# Patient Record
Sex: FEMALE | Race: WHITE
[De-identification: names, ages, dates, MRNs, and addresses within clinical notes are randomized per-mention and may not be internally consistent; named-entity substitution may affect disease eponyms.]

---

## 2017-05-07 ENCOUNTER — HOSPITAL ENCOUNTER (EMERGENCY)
Dept: HOSPITAL 62 - ER | Age: 62
Discharge: HOME | End: 2017-05-07
Payer: COMMERCIAL

## 2017-05-07 VITALS — SYSTOLIC BLOOD PRESSURE: 144 MMHG | DIASTOLIC BLOOD PRESSURE: 86 MMHG

## 2017-05-07 DIAGNOSIS — N39.0: ICD-10-CM

## 2017-05-07 DIAGNOSIS — R25.2: ICD-10-CM

## 2017-05-07 DIAGNOSIS — L29.8: ICD-10-CM

## 2017-05-07 DIAGNOSIS — Z88.2: ICD-10-CM

## 2017-05-07 DIAGNOSIS — Z85.41: ICD-10-CM

## 2017-05-07 DIAGNOSIS — G43.001: Primary | ICD-10-CM

## 2017-05-07 DIAGNOSIS — R42: ICD-10-CM

## 2017-05-07 DIAGNOSIS — I10: ICD-10-CM

## 2017-05-07 DIAGNOSIS — T37.0X5A: ICD-10-CM

## 2017-05-07 LAB
ALBUMIN SERPL-MCNC: 4.5 G/DL (ref 3.5–5)
ALP SERPL-CCNC: 92 U/L (ref 38–126)
ALT SERPL-CCNC: 55 U/L (ref 9–52)
ANION GAP SERPL CALC-SCNC: 10 MMOL/L (ref 5–19)
APPEARANCE UR: CLEAR
AST SERPL-CCNC: 38 U/L (ref 14–36)
BASOPHILS # BLD AUTO: 0 10^3/UL (ref 0–0.2)
BASOPHILS NFR BLD AUTO: 0.2 % (ref 0–2)
BILIRUB DIRECT SERPL-MCNC: 0.6 MG/DL (ref 0–0.4)
BILIRUB SERPL-MCNC: 1.1 MG/DL (ref 0.2–1.3)
BILIRUB UR QL STRIP: NEGATIVE
BUN SERPL-MCNC: 8 MG/DL (ref 7–20)
CALCIUM: 10.1 MG/DL (ref 8.4–10.2)
CHLORIDE SERPL-SCNC: 102 MMOL/L (ref 98–107)
CO2 SERPL-SCNC: 26 MMOL/L (ref 22–30)
CREAT SERPL-MCNC: 0.86 MG/DL (ref 0.52–1.25)
EOSINOPHIL # BLD AUTO: 0.1 10^3/UL (ref 0–0.6)
EOSINOPHIL NFR BLD AUTO: 1.5 % (ref 0–6)
ERYTHROCYTE [DISTWIDTH] IN BLOOD BY AUTOMATED COUNT: 14.6 % (ref 11.5–14)
GLUCOSE SERPL-MCNC: 110 MG/DL (ref 75–110)
GLUCOSE UR STRIP-MCNC: NEGATIVE MG/DL
HCT VFR BLD CALC: 43.9 % (ref 36–47)
HGB BLD-MCNC: 14.8 G/DL (ref 12–15.5)
HGB HCT DIFFERENCE: 0.5
KETONES UR STRIP-MCNC: NEGATIVE MG/DL
LYMPHOCYTES # BLD AUTO: 0.9 10^3/UL (ref 0.5–4.7)
LYMPHOCYTES NFR BLD AUTO: 15 % (ref 13–45)
MCH RBC QN AUTO: 27.8 PG (ref 27–33.4)
MCHC RBC AUTO-ENTMCNC: 33.6 G/DL (ref 32–36)
MCV RBC AUTO: 83 FL (ref 80–97)
MONOCYTES # BLD AUTO: 0.3 10^3/UL (ref 0.1–1.4)
MONOCYTES NFR BLD AUTO: 5.9 % (ref 3–13)
NEUTROPHILS # BLD AUTO: 4.6 10^3/UL (ref 1.7–8.2)
NEUTS SEG NFR BLD AUTO: 77.4 % (ref 42–78)
NITRITE UR QL STRIP: NEGATIVE
PH UR STRIP: 7 [PH] (ref 5–9)
POTASSIUM SERPL-SCNC: 3.7 MMOL/L (ref 3.6–5)
PROT SERPL-MCNC: 8 G/DL (ref 6.3–8.2)
PROT UR STRIP-MCNC: NEGATIVE MG/DL
RBC # BLD AUTO: 5.32 10^6/UL (ref 3.72–5.28)
SODIUM SERPL-SCNC: 138.1 MMOL/L (ref 137–145)
SP GR UR STRIP: 1.01
UROBILINOGEN UR-MCNC: NEGATIVE MG/DL (ref ?–2)
WBC # BLD AUTO: 5.9 10^3/UL (ref 4–10.5)

## 2017-05-07 PROCEDURE — 85025 COMPLETE CBC W/AUTO DIFF WBC: CPT

## 2017-05-07 PROCEDURE — 36415 COLL VENOUS BLD VENIPUNCTURE: CPT

## 2017-05-07 PROCEDURE — 70450 CT HEAD/BRAIN W/O DYE: CPT

## 2017-05-07 PROCEDURE — 96361 HYDRATE IV INFUSION ADD-ON: CPT

## 2017-05-07 PROCEDURE — 96376 TX/PRO/DX INJ SAME DRUG ADON: CPT

## 2017-05-07 PROCEDURE — 99284 EMERGENCY DEPT VISIT MOD MDM: CPT

## 2017-05-07 PROCEDURE — 96374 THER/PROPH/DIAG INJ IV PUSH: CPT

## 2017-05-07 PROCEDURE — 80053 COMPREHEN METABOLIC PANEL: CPT

## 2017-05-07 PROCEDURE — 81001 URINALYSIS AUTO W/SCOPE: CPT

## 2017-05-07 PROCEDURE — 87086 URINE CULTURE/COLONY COUNT: CPT

## 2017-05-07 PROCEDURE — 96375 TX/PRO/DX INJ NEW DRUG ADDON: CPT

## 2017-05-07 RX ADMIN — SODIUM CHLORIDE PRN ML: 9 INJECTION, SOLUTION INTRAVENOUS at 16:40

## 2017-05-07 RX ADMIN — SODIUM CHLORIDE PRN ML: 9 INJECTION, SOLUTION INTRAVENOUS at 14:53

## 2017-05-07 NOTE — ER DOCUMENT REPORT
ED Medical Screen (RME)





- General


Chief Complaint: Headache


Stated Complaint: HEADACHE,BACK PAIN,NAUSEA


Time seen by provider: 13:53


Mode of Arrival: Ambulatory


Information source: Patient


Notes: 


This is a 62-year-old female with a history of chronic neuropathic pain to the 

right upper extremity who presents with itching, cramping after taking one 

Bactrim.  Patient was recently diagnosed with a possible UTI and started on 

Bactrim last night.  She states within 10 minutes, she started having diffuse 

itching followed by cramping and nausea.


TRAVEL OUTSIDE OF THE [place] IN LAST 30 DAYS: No





- Related Data


Allergies/Adverse Reactions: 


 





aspirin [Aspirin] Adverse Reaction (Mild, Verified 17 13:17)


 rash


Sulfa (Sulfonamide Antibiotics) Adverse Reaction (Verified 17 13:51)


 











Past Medical History





- Social History


Frequency of alcohol use: None


Drug Abuse: None


Renal/ Medical History: Denies: Hx Peritoneal Dialysis


Malignancy Medical History: Reports: Hx Cervical Cancer


GI Medical History: Reports: Hx Gastroesophageal Reflux Disease


Musculoskeltal Medical History: Reports Hx Arthritis


Psychiatric Medical History: Reports: Hx Depression - anxiety


Past Surgical History: Reports: Hx Appendectomy, Hx  Section, Hx 

Hysterectomy, Hx Tonsillectomy





- Immunizations


Hx Diphtheria, Pertussis, Tetanus Vaccination: Yes





Physical Exam





- Vital signs


Vitals: 





 











Temp Pulse Resp BP Pulse Ox


 


 98.9 F   86   20   108/81   99 


 


 17 13:17  17 13:17  17 13:17  17 13:17  17 13:17














Course





- Vital Signs


Vital signs: 





 











Temp Pulse Resp BP Pulse Ox


 


 98.9 F   86   20   108/81   99 


 


 17 13:17  17 13:17  17 13:17  17 13:17  17 13:17

## 2017-05-07 NOTE — ER DOCUMENT REPORT
ED General





- General


Mode of Arrival: Ambulatory


Information source: Patient, Relative - Spouse


TRAVEL OUTSIDE OF THE [place] IN LAST 30 DAYS: No





- HPI


Onset: Yesterday - Refer to HPI notes


Quality of pain: Cramping


Similar symptoms previously: No


Recently seen / treated by doctor: Yes





<CHIP HAMILTON - Last Filed: 17 19:21>





<CASJUANITAPAXTON - Last Filed: 17 21:37>





- General


Chief Complaint: Headache


Stated Complaint: HEADACHE,BACK PAIN,NAUSEA


Notes: 


Patient is a 62-year-old female presented to the emergency department for 

itching, cramping, and headache after taking Bactrim. Patient states that she 

took her first dose of Bactrim last night around 18:00 and her symptoms were 

onset 10 minutes after. Patient states she was diagnosed with a UTI by 

[facility] and prescribed Bactrim. Patient only took the initial 

dose yesterday and has not taken any further doses. Patient states that she had 

some itching on her hands and forearms and the right side of her body had 

muscle cramps. Patient also complains to a headache which she describes as 

pressure and relates her head pain to a previous migraine. Patient states that 

she does not get migraines or headaches like this when she has her chronic 

nerve pain. Patient does complain of some dizziness which is chronic. Patient 

states that she has chronic neuropathic pain to the right side of her body from 

a previous fall. Patient denies taking any muscle examiners. (CHIP HAMILTON)





- Related Data


Allergies/Adverse Reactions: 


 





aspirin [Aspirin] Adverse Reaction (Mild, Verified 17 13:17)


 rash


Sulfa (Sulfonamide Antibiotics) Adverse Reaction (Verified 17 13:51)


 











Past Medical History





- General


Information source: Patient





- Social History


Smoking Status: Never Smoker


Frequency of alcohol use: None


Drug Abuse: None


Family History: Reviewed & Not Pertinent


Patient has suicidal ideation: No


Patient has homicidal ideation: No


Renal/ Medical History: Denies: Hx Peritoneal Dialysis


Malignancy Medical History: Reports: Hx Cervical Cancer


GI Medical History: Reports: Hx Gastroesophageal Reflux Disease


Musculoskeltal Medical History: Reports Hx Arthritis


Psychiatric Medical History: Reports: Hx Depression - anxiety


Past Surgical History: Reports: Hx Appendectomy, Hx  Section, Hx 

Hysterectomy, Hx Tonsillectomy





- Immunizations


Hx Diphtheria, Pertussis, Tetanus Vaccination: Yes





<CHIP HAMILTON - Last Filed: 17 19:21>





Review of Systems





- Review of Systems


Constitutional: No symptoms reported


EENT: No symptoms reported


Cardiovascular: No symptoms reported


Respiratory: No symptoms reported


Gastrointestinal: No symptoms reported


Genitourinary: No symptoms reported


Female Genitourinary: No symptoms reported


Musculoskeletal: See HPI


Skin: See HPI, Change in color


Hematologic/Lymphatic: No symptoms reported


Neurological/Psychological: See HPI, Headaches


-: Yes All other systems reviewed and negative





<HARRISONCHIP DOWNEY - Last Filed: 17 19:21>





Physical Exam





<HARRISONSHAYANTYLERCHIP - Last Filed: 17 19:21>





<PAXTON DONATO - Last Filed: 17 21:37>





- Vital signs


Vitals: 


 











Temp Pulse Resp BP Pulse Ox


 


 98.9 F   86   20   108/81   99 


 


 17 13:17  17 13:17  17 13:17  17 13:17  17 13:17














- Notes


Notes: 


GENERAL: Alert, interacts well. No acute distress.


HEAD: Normocephalic, atraumatic.


EYES: Pupils equal, round, and reactive to light. Extraocular movements intact.


ENT: Oral mucosa moist, tongue midline. No drooling


NECK: Right-sided paracervical tenderness to palpation. Full range of motion. 

Supple. Trachea midline.


LUNGS: Clear to auscultation bilaterally, no wheezes, rales, or rhonchi. No 

respiratory distress.


HEART: Regular rate and rhythm. No murmurs, gallops, or rubs.


ABDOMEN: Soft, non-tender. Non-distended. Bowel sounds present in all 4 

quadrants.


EXTREMITIES: Moves all 4 extremities spontaneously. No edema, radial and 

dorsalis pedis pulses 2/4 bilaterally. No cyanosis.


NEUROLOGICAL: Alert and oriented x3. Photophobia. Normal speech. Cranial nerves 

II through XII grossly intact. Biceps and patellar DTRs 2+ bilaterally.


PSYCH: Normal affect, normal mood.


SKIN: Warm, dry, normal turgor. No hives, rashes or lesions noted. (CHIP HAMILTON)





Course





- Laboratory


Result Diagrams: 


 17 14:30





 17 14:30





<CHIP HAMILTON - Last Filed: 17 19:21>





- Laboratory


Result Diagrams: 


 17 14:30





 17 14:30





<PAXTON DONATO - Last Filed: 17 21:37>





- Vital Signs


Vital signs: 


 











Temp Pulse Resp BP Pulse Ox


 


 98.9 F   67   18   144/86 H  99 


 


 17 13:17  17 21:20  17 21:20  17 21:20  17 21:20














- Laboratory


Laboratory results interpreted by me: 


 











  17





  14:30 14:30 14:30


 


RBC  5.32 H  


 


RDW  14.6 H  


 


Direct Bilirubin   0.6 H 


 


AST   38 H 


 


ALT   55 H 


 


Urine Blood    SMALL H














Discharge





<CHIP HAMILTON - Last Filed: 17 19:21>





<PAXTON DONATO - Last Filed: 17 21:37>





- Discharge


Clinical Impression: 


 Muscle cramping





Migraine


Qualifiers:


 Migraine type: without aura Status migrainosus presence: with status 

migrainosus Intractability: not intractable Qualified Code(s): G43.001 - 

Migraine without aura, not intractable, with status migrainosus





Adverse reaction to sulfa antibiotic


Qualifiers:


 Encounter type: initial encounter Qualified Code(s): T37.0X5A - Adverse effect 

of sulfonamides, initial encounter





Hypertension


Qualifiers:


 Hypertension type: essential hypertension Qualified Code(s): I10 - Essential (

primary) hypertension





Condition: Stable


Disposition: HOME, SELF-CARE


Additional Instructions: 


Today we did not see any sign of infection or bleeding in her brain.  We 

treated her headache initially with Toradol, Compazine and Benadryl.  We later 

treated with Haldol and Benadryl.  The Haldol and Benadryl seemed to work 

better for you.  Please drink plenty of fluids and do not skip any of her home 

medications.  Please follow-up with your primary care physician within the next 

several days.


Forms:  Elevated Blood Pressure


Scribe Attestation: 





17 21:37


I personally performed the services described in the documentation, reviewed 

and edited the documentation which was dictated to the scribe in my presence, 

and it accurately records my words and actions. (PAXTON DONATO)





Scribe Documentation





- Scribe


Written by Terri:: Terri Crisostomo, 17 19:30


acting as scribe for :: Jill





<CHIP HAMILTON - Last Filed: 17 19:21>

## 2020-02-18 ENCOUNTER — HOSPITAL ENCOUNTER (EMERGENCY)
Dept: HOSPITAL 62 - ER | Age: 65
Discharge: HOME | End: 2020-02-18
Payer: COMMERCIAL

## 2020-02-18 VITALS — DIASTOLIC BLOOD PRESSURE: 68 MMHG | SYSTOLIC BLOOD PRESSURE: 139 MMHG
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
## 2020-02-18 DIAGNOSIS — Z85.41: ICD-10-CM

## 2020-02-18 DIAGNOSIS — Z90.710: ICD-10-CM

## 2020-02-18 DIAGNOSIS — S82.65XA: Primary | ICD-10-CM

## 2020-02-18 DIAGNOSIS — Z88.6: ICD-10-CM

## 2020-02-18 DIAGNOSIS — Z88.2: ICD-10-CM

## 2020-02-18 PROCEDURE — 99283 EMERGENCY DEPT VISIT LOW MDM: CPT

## 2020-02-18 NOTE — RADIOLOGY REPORT (SQ)
EXAM DESCRIPTION:  ANKLE LEFT COMPLETE



COMPLETED DATE/TIME:  2/18/2020 6:23 pm



REASON FOR STUDY:  bed 1 s/p fall +swelling/deformity with tenderness



COMPARISON:  None.



NUMBER OF VIEWS:  Three views.



TECHNIQUE:  AP, lateral, and oblique radiographic images acquired of the left ankle.



LIMITATIONS:  None.



FINDINGS:  MINERALIZATION: Normal.

BONES: Acute nondisplaced spiral fracture of the distal fibula.  There is no other fracture.  The ank
le mortise and talar dome are intact.

JOINTS: No effusions.

SOFT TISSUES: Soft tissue swelling adjacent to the distal fibular fracture.

OTHER: No other finding.



IMPRESSION:  Acute nondisplaced spiral fracture of the distal fibula.



TECHNICAL DOCUMENTATION:  JOB ID:  [id number]

 2011 [employer]- All Rights Reserved



Reading location - IP/workstation name: [email or web address]

## 2020-02-18 NOTE — ER DOCUMENT REPORT
ED General





- General


Chief Complaint: Fall Injury


Stated Complaint: LEFT ANKLE PAIN


Time Seen by Provider: 20 18:55


Primary Care Provider: 


FABI ANTONIO MD [Primary Care Provider] - Follow up as needed


Mode of Arrival: Medic


Information source: Patient


TRAVEL OUTSIDE OF THE [place] IN LAST 30 DAYS: No





- HPI


Onset: Just prior to arrival


Onset/Duration: Sudden


Quality of pain: Pressure, Sharp


Severity: Severe


Pain Level: 5


Associated symptoms: Other - ankle swelling and bruising


Exacerbated by: Movement, Other - weight bearing


Relieved by: Other - rest and elevation


Similar symptoms previously: No


Recently seen / treated by doctor: No


Notes: 





64 year old female with a history of Arthritis, GERD, Depression here for left 

ankle pain, swelling, bruising and left great toe pain which all started after 

she mistepdd and fell landing on her ankle. The patient was unable to ambulate 

after the fall. The patient denies numbness or tingling in her affected foot.





- Related Data


Allergies/Adverse Reactions: 


                                        





aspirin [Aspirin] Adverse Reaction (Mild, Verified 20 18:04)


   rash


Sulfa (Sulfonamide Antibiotics) Adverse Reaction (Verified 20 18:04)


   











Past Medical History





- General


Information source: Patient





- Social History


Smoking Status: Never Smoker


Chew tobacco use (# tins/day): No


Frequency of alcohol use: None


Drug Abuse: None


Family History: Reviewed & Not Pertinent


Patient has suicidal ideation: No


Patient has homicidal ideation: No


Renal/ Medical History: Denies: Hx Peritoneal Dialysis


Malignancy Medical History: Reports: Hx Cervical Cancer


GI Medical History: Reports: Hx Gastroesophageal Reflux Disease


Musculoskeletal Medical History: Reports Hx Arthritis


Psychiatric Medical History: Reports: Hx Depression - anxiety


Past Surgical History: Reports: Hx Appendectomy, Hx  Section, Hx 

Hysterectomy, Hx Tonsillectomy





- Immunizations


Hx Diphtheria, Pertussis, Tetanus Vaccination: Yes





Review of Systems





- Review of Systems


Constitutional: No symptoms reported


EENT: No symptoms reported


Cardiovascular: No symptoms reported


Respiratory: No symptoms reported


Gastrointestinal: No symptoms reported


Genitourinary: No symptoms reported


Female Genitourinary: No symptoms reported


Musculoskeletal: Joint pain - left ankle and left great toe, Ankle swelling - 

left sided (lateral) with bruising and pain


Skin: Other - bruising of skin around left lateral ankle


Hematologic/Lymphatic: No symptoms reported


Neurological/Psychological: No symptoms reported


-: Yes All other systems reviewed and negative





Physical Exam





- Vital signs


Vitals: 





                                        











Temp Pulse Resp BP Pulse Ox


 


 97.8 F   67   18   145/78 H  97 


 


 20 18:53  20 18:53  20 18:53  20 18:53  20 18:53














- Notes


Notes: 





GENERAL: Well-appearing, well-nourished and in no acute distress.


HEAD: Atraumatic, normocephalic.


EYES: Pupils equal round and reactive to light, extraocular movements intact, 

sclera anicteric, conjunctiva are normal.


ENT: TMs normal, nares patent, oropharynx clear without exudates.  Moist mucous 

membranes.


NECK: Normal range of motion, supple without lymphadenopathy or JVD.


LUNGS: Breath sounds clear to auscultation bilaterally and equal.  No wheezes 

rales or rhonchi.


HEART: Regular rate and rhythm without murmurs, rubs or gallops.


ABDOMEN: Soft, nontender, normoactive bowel sounds.  No guarding, no rebound.  

No masses appreciated.


EXTREMITIES: Tender over left lateral ankle with swelling and bruising. 

Decreased range of motion in left ankle due to pain. Tender over left great toe 

with no swelling or bruising. No clubbing or cyanosis. 2+ DP and PT pulses.


NEUROLOGICAL: Cranial nerves II through XII grossly intact.  Normal speech, 

normal gait.


PSYCH: Normal mood, normal affect.


SKIN: Warm, Dry, normal turgor, no rashes or lesions noted.





Course





- Re-evaluation


Re-evalutation: 





20 19:12


The patient has a nondisplaced spiral fracture of her left distal fibula. Will 

have nursing splint the ankle and supply crutches. Will refer patient to 

orthopedics (Dr. Ac is on call) as an outpatient. Patient told not to weight

 bear.





- Vital Signs


Vital signs: 





                                        











Temp Pulse Resp BP Pulse Ox


 


 97.8 F   67   18   145/78 H  97 


 


 20 18:53  20 18:53  20 18:53  20 18:53  20 18:53














- Diagnostic Test


Radiology reviewed: Image reviewed, Reports reviewed





Discharge





- Discharge


Clinical Impression: 


Fibula fracture


Qualifiers:


 Encounter type: initial encounter Fibula location: lateral malleolus Fracture 

type: closed Fracture alignment: nondisplaced Laterality: left Qualified 

Code(s): S82.65XA - Nondisplaced fracture of lateral malleolus of left fibula, 

initial encounter for closed fracture





Condition: Stable


Disposition: HOME, SELF-CARE


Instructions:  Fracture of Distal Fibula ([facility]), Use of Crutches ([facility])


Additional Instructions: 


Keep the splint on until cleared by Orthopedics. Follow up with Dr. Ac or 

with one of his colleagues. Use tylenol and motrin for pain and Norco for pain 

not well controlled.


Prescriptions: 


Hydrocodone/Acetaminophen [Norco 5-325 mg Tablet] 1 tab PO Q8H PRN #10 tablet


 PRN Reason: 


Referrals: 


FABI ANTONIO MD [Primary Care Provider] - Follow up as needed


OMAIRA AC MD [ACTIVE STAFF] - Follow up as needed

## 2020-02-18 NOTE — RADIOLOGY REPORT (SQ)
EXAM DESCRIPTION:  FOOT LEFT COMPLETE



COMPLETED DATE/TIME:  2/18/2020 7:25 pm



REASON FOR STUDY:  eval for trauma to first metatarsal



COMPARISON:  None.



NUMBER OF VIEWS:  Three views.



TECHNIQUE:  AP, lateral and oblique  radiographic images acquired of the left foot.



LIMITATIONS:  None.



FINDINGS:  MINERALIZATION: Normal.

BONES: No acute fracture or dislocation.  No worrisome bone lesions.

JOINTS: No effusions.

SOFT TISSUES: No soft tissue swelling.  No foreign body.

OTHER: No other significant finding.



IMPRESSION:  NEGATIVE STUDY OF THE LEFT FOOT. NO RADIOGRAPHIC EVIDENCE OF ACUTE INJURY.



TECHNICAL DOCUMENTATION:  JOB ID:  [id number]

 2011 [employer]- All Rights Reserved



Reading location - IP/workstation name: [email or web address]

## 2020-06-18 ENCOUNTER — HOSPITAL ENCOUNTER (EMERGENCY)
Dept: HOSPITAL 62 - ER | Age: 65
Discharge: HOME | End: 2020-06-18
Payer: COMMERCIAL

## 2020-06-18 VITALS — SYSTOLIC BLOOD PRESSURE: 110 MMHG | DIASTOLIC BLOOD PRESSURE: 68 MMHG

## 2020-06-18 DIAGNOSIS — R53.81: ICD-10-CM

## 2020-06-18 DIAGNOSIS — R19.7: ICD-10-CM

## 2020-06-18 DIAGNOSIS — Z85.41: ICD-10-CM

## 2020-06-18 DIAGNOSIS — R52: ICD-10-CM

## 2020-06-18 DIAGNOSIS — R05: ICD-10-CM

## 2020-06-18 DIAGNOSIS — Z87.19: ICD-10-CM

## 2020-06-18 DIAGNOSIS — Z87.891: ICD-10-CM

## 2020-06-18 DIAGNOSIS — U07.1: Primary | ICD-10-CM

## 2020-06-18 DIAGNOSIS — R11.2: ICD-10-CM

## 2020-06-18 LAB
ADD MANUAL DIFF: NO
ANION GAP SERPL CALC-SCNC: 7 MMOL/L (ref 5–19)
BASOPHILS # BLD AUTO: 0 10^3/UL (ref 0–0.2)
BASOPHILS NFR BLD AUTO: 0.4 % (ref 0–2)
BUN SERPL-MCNC: 13 MG/DL (ref 7–20)
CALCIUM: 9.7 MG/DL (ref 8.4–10.2)
CHLORIDE SERPL-SCNC: 96 MMOL/L (ref 98–107)
CO2 SERPL-SCNC: 31 MMOL/L (ref 22–30)
EOSINOPHIL # BLD AUTO: 0 10^3/UL (ref 0–0.6)
EOSINOPHIL NFR BLD AUTO: 0.1 % (ref 0–6)
ERYTHROCYTE [DISTWIDTH] IN BLOOD BY AUTOMATED COUNT: 13.7 % (ref 11.5–14)
GLUCOSE SERPL-MCNC: 105 MG/DL (ref 75–110)
HCT VFR BLD CALC: 43.7 % (ref 36–47)
HGB BLD-MCNC: 15 G/DL (ref 12–15.5)
LYMPHOCYTES # BLD AUTO: 0.6 10^3/UL (ref 0.5–4.7)
LYMPHOCYTES NFR BLD AUTO: 18.6 % (ref 13–45)
MCH RBC QN AUTO: 28.7 PG (ref 27–33.4)
MCHC RBC AUTO-ENTMCNC: 34.4 G/DL (ref 32–36)
MCV RBC AUTO: 83 FL (ref 80–97)
MONOCYTES # BLD AUTO: 0.3 10^3/UL (ref 0.1–1.4)
MONOCYTES NFR BLD AUTO: 8.7 % (ref 3–13)
NEUTROPHILS # BLD AUTO: 2.2 10^3/UL (ref 1.7–8.2)
NEUTS SEG NFR BLD AUTO: 72.2 % (ref 42–78)
PLATELET # BLD: 161 10^3/UL (ref 150–450)
POTASSIUM SERPL-SCNC: 3.6 MMOL/L (ref 3.6–5)
RBC # BLD AUTO: 5.24 10^6/UL (ref 3.72–5.28)
TOTAL CELLS COUNTED % (AUTO): 100 %
WBC # BLD AUTO: 3 10^3/UL (ref 4–10.5)

## 2020-06-18 PROCEDURE — 36415 COLL VENOUS BLD VENIPUNCTURE: CPT

## 2020-06-18 PROCEDURE — 80048 BASIC METABOLIC PNL TOTAL CA: CPT

## 2020-06-18 PROCEDURE — 85025 COMPLETE CBC W/AUTO DIFF WBC: CPT

## 2020-06-18 PROCEDURE — S0119 ONDANSETRON 4 MG: HCPCS

## 2020-06-18 PROCEDURE — 96360 HYDRATION IV INFUSION INIT: CPT

## 2020-06-18 PROCEDURE — 87635 SARS-COV-2 COVID-19 AMP PRB: CPT

## 2020-06-18 PROCEDURE — C9803 HOPD COVID-19 SPEC COLLECT: HCPCS

## 2020-06-18 PROCEDURE — 99283 EMERGENCY DEPT VISIT LOW MDM: CPT

## 2020-06-18 NOTE — ER DOCUMENT REPORT
ED General





- General


Chief Complaint: Nausea/Vomiting/Diarrhea


Stated Complaint: VOMITING,DIARRHEA,WEAKNESS,FEVER


Time Seen by Provider: 20 19:53


Primary Care Provider: 


FABI ANTONIO MD [Primary Care Provider] - Follow up as needed


Notes: 





Patient is a 65-year-old male presenting with about 5 days of diarrhea and 

vomiting.  Diarrhea has tapered off the vomiting continues and Keeping things 

down.  She feels fatigued has body aches and developed a cough today.  No 

shortness of breath.  No abdominal pain or cramping.  History of some kind of 

surgery where they "cut my intestines."  Manifested by an infraumbilical 

vertical incision.  Passing gas today and denies distention.


TRAVEL OUTSIDE OF THE [place] IN LAST 30 DAYS: No





- Related Data


Allergies/Adverse Reactions: 


                                        





aspirin [Aspirin] Adverse Reaction (Mild, Verified 20 18:04)


   rash


Sulfa (Sulfonamide Antibiotics) Adverse Reaction (Verified 20 18:04)


   











Past Medical History





- General


Information source: Patient





- Social History


Smoking Status: Former Smoker


Family History: Reviewed & Not Pertinent


Patient has homicidal ideation: No


Renal/ Medical History: Denies: Hx Peritoneal Dialysis


Malignancy Medical History: Reports: Hx Cervical Cancer


GI Medical History: Reports: Hx Gastroesophageal Reflux Disease


Musculoskeletal Medical History: Reports Hx Arthritis


Psychiatric Medical History: Reports: Hx Depression - anxiety


Past Surgical History: Reports: Hx Appendectomy, Hx  Section, Hx 

Hysterectomy, Hx Tonsillectomy





- Immunizations


Hx Diphtheria, Pertussis, Tetanus Vaccination: Yes





Review of Systems





- Review of Systems


Notes: 





REVIEW OF SYSTEMS





GEN: [place] and aches with subjective fevers s


ENT: Denies sore throat, nasal discharge, ear pain


EYES: Denies blurry vision, eye pain, discharge


CV: Denies chest pain, palpitations, edema


RESP: Denies cough, shortness of breath, wheezing


GI: HPI


MSK: Denies joint pain/swelling, edema, 


SKIN: Denies rash, skin lesions


LYMPH: Denies swollen glands/lymph nodes


NEURO: Denies headache, focal weakness or numbness, dizziness


PSYCH: Denies depression, suicidal or homicidal ideation








PHYSICAL EXAMINATION





General: No acute distress, well-nourished


Head: Atraumatic, normocephalic


ENT: Mouth normal, oropharynx moist, no exudates or tonsillar enlargement


Eyes: Conjunctiva normal, pupils equal, lids normal


Neck: No JVD, supple, no guarding


CVS: Normal rate, regular rhythm, no murmurs


Resp: No resp distress, equal and normal breath sounds bilaterally


GI: Nondistended, soft, no tenderness to palpation, no rebound or guarding


Ext: No deformities, no edema, normal range of motion in upper and lower ext


Back: No CVA or midline TTP


Skin: Poor turgor


Lymphatic: No lymphadeopathy noted


Neuro: Awake, alert.  Face symmetric.  GCS 15.





Physical Exam





- Vital signs


Vitals: 


                                        











Temp Pulse Resp BP Pulse Ox


 


 99.0 F   84   20   101/71   95 


 


 20 19:48  20 19:48  20 19:48  20 19:48  20 19:48














Course





- Re-evaluation


Re-evalutation: 





20 22:49


65-year-old lady presents with vomiting diarrhea diarrhea is resolving vomiting 

is not occurring in the ER.  She has no abdominal pain.  She has a prior surgery

 but has no distention good bowel sounds with no tenderness on exam so I think 

obstruction is unlikely


Labs were done.  Clinically dehydrated, labs match.  Given IV fluids antiemetics

 in the ED tolerated p.o.  Discharged with COVID precautions as this may reflect

 COVID especially given her mild leukopenia.  I have discussed with the patient 

there likely diagnosis, aftercare plan, follow-up plans and my usual and 

customary return precautions.  They verbalized understanding of this.





- Vital Signs


Vital signs: 


                                        











Temp Pulse Resp BP Pulse Ox


 


 99.0 F   84   20   101/71   95 


 


 20 19:48  20 19:48  20 19:48  20 19:48  20 19:48














- Laboratory


Result Diagrams: 


                                 20 21:12





                                 20 21:12


Laboratory results interpreted by me: 


                                        











  20





  21:12 21:12


 


WBC  3.0 L 


 


Sodium   134.1 L


 


Chloride   96 L


 


Carbon Dioxide   31 H














Discharge





- Discharge


Clinical Impression: 


 Viral syndrome





Condition: Good


Disposition: HOME, SELF-CARE


Instructions:  Viral Syndrome ([facility]), Diarrhea, Nonspecific ([facility])


Additional Instructions: 


You have been evaluated for complaints or symptoms which could reflect infection

 with coronavirus/Covid-19 disease.  Your test is pending and should be back 

between 24-48 hours, until then you should assume you are infected.  Please stay

 at home with minimal interaction to others, wash your hands, practice social 

distancing while at home, and remained at home without any travel outside of the

 home for:





1. At least 3 days (72 hours) have passed since recovery defined as resolution 

of fever without the use of fever-reducing medications and improvement in 

respiratory symptoms (e.g., cough, shortness of breath)





AND





2. At least 7 days have passed since symptoms first appeared.


Prescriptions: 


Ondansetron [Zofran Odt 4 mg Tablet] 1 - 2 tab PO Q4H PRN #15 tab.rapdis


 PRN Reason: For Nausea/Vomiting


Referrals: 


FABI ANTONIO MD [Primary Care Provider] - Follow up as needed